# Patient Record
Sex: FEMALE | Race: WHITE
[De-identification: names, ages, dates, MRNs, and addresses within clinical notes are randomized per-mention and may not be internally consistent; named-entity substitution may affect disease eponyms.]

---

## 2018-03-14 ENCOUNTER — HOSPITAL ENCOUNTER (EMERGENCY)
Dept: HOSPITAL 44 - ED | Age: 23
Discharge: HOME | End: 2018-03-14
Payer: COMMERCIAL

## 2018-03-14 VITALS — SYSTOLIC BLOOD PRESSURE: 129 MMHG | DIASTOLIC BLOOD PRESSURE: 72 MMHG

## 2018-03-14 DIAGNOSIS — K59.00: ICD-10-CM

## 2018-03-14 DIAGNOSIS — E87.6: ICD-10-CM

## 2018-03-14 DIAGNOSIS — R10.9: Primary | ICD-10-CM

## 2018-03-14 LAB
BASOPHILS NFR BLD: 0.3 % (ref 0–1.5)
EGFR (NON-AFRICAN): > 60
EOSINOPHIL NFR BLD: 2.1 % (ref 0–6.8)
MCH RBC QN AUTO: 30.4 PG (ref 28–34)
MCV RBC AUTO: 88.3 FL (ref 80–100)
MONOCYTES %: 5.8 % (ref 0–11)
NEUTROPHILS #: 4.2 # K/UL (ref 1.4–7.7)

## 2018-03-14 PROCEDURE — 80053 COMPREHEN METABOLIC PANEL: CPT

## 2018-03-14 PROCEDURE — 99283 EMERGENCY DEPT VISIT LOW MDM: CPT

## 2018-03-14 PROCEDURE — 99282 EMERGENCY DEPT VISIT SF MDM: CPT

## 2018-03-14 PROCEDURE — 83690 ASSAY OF LIPASE: CPT

## 2018-03-14 PROCEDURE — 74022 RADEX COMPL AQT ABD SERIES: CPT

## 2018-03-14 PROCEDURE — 85025 COMPLETE CBC W/AUTO DIFF WBC: CPT

## 2018-03-14 NOTE — ED PHYSICIAN DOCUMENTATION
Abdominal Pain





- HISTORIAN


Historian: patient, spouse





- HPI


Stated Complaint: Right Side Abdominal Pain


Chief Complaint: Abdominal Pain


Additonal Information: 





genl abd pain yaima urq and suprapubic.  onset 3 d ago had good bm sunday sl 

better and lesseer bms since apin perhaps worse.  concerned re cancer or other 

serious condition-pt states sig anxiety


Onset: days ago (3)


Duration: constant, waxing, waning


Timing: still present


Context: denies: out of country travel, bad food, recent trauma


Severity: moderate


Quality: pain, cramping


Associated Symptoms: nausea.  denies: fever, chills, vomiting





- ROS


CONST: no problems


GI/: denies: constipation, black stools, bloody urine, bloody stools, dark 

urine, problems urinating


CVS/RESP: none


EYES/ENT: none


MS/SKIN/LYMPH: none, other (sl red rash "due to anxiety")


NEURO/PSYCH: anxiety





- SOCIAL HX


Smoking History: non-smoker


Alcohol Use: none


Drug Use: none





- FAMILY HX


Family History: no significant history





- PAST HX


Past History: none


Ischemic Bowel Risk Factors: none


Surgeries/Procedures: none


Immunizations: UTD.  denies: influenza, pneumovax


Home Medications: 


 Ambulatory Orders











 Medication  Instructions  Recorded


 


NK [NK]  03/14/18











Allergies/Adverse Reactions: 


 Allergies











Allergy/AdvReac Type Severity Reaction Status Date / Time


 


No Known Allergies Allergy   Unverified 03/14/18 20:39














- VITAL SIGNS


Vital Signs: 


 Vital Signs











Temp Pulse Resp BP Pulse Ox


 


 97.4 F L  99 H  18   134/68   99 


 


 03/14/18 20:30  03/14/18 20:30  03/14/18 20:30  03/14/18 20:30  03/14/18 20:30














- REVIEWED ASSESSMENTS


Nursing Assessment  Reviewed: Yes


Vitals Reviewed: Yes





ED Results Lab/Radiology





- Lab Results


Lab Results: 


 Lab Results











  03/14/18 03/14/18 03/14/18





  21:00 21:00 21:00


 


WBC      6.40 K/ul K/ul





     (4.00-12.00) 


 


RBC      4.45 M/ul M/ul





     (3.90-5.20) 


 


Hgb      13.5 g/dL g/dL





     (12.0-16.0) 


 


Hct      39.3 % %





     (34.5-46.5) 


 


MCV      88.3 fl fl





     (80.0-100.0) 


 


MCH      30.4 pg pg





     (28.0-34.0) 


 


MCHC      34.4 g/dL g/dL





     (30.0-36.0) 


 


RDW      13.0 % %





     (11.3-14.3) 


 


Plt Count      189 K/mm3 K/mm3





     (130-400) 


 


Neut % (Auto)      64.6 % %





     (39.0-79.0) 


 


Lymph % (Auto)      24.9 % %





     (16.0-50.0) 


 


Mono % (Auto)      5.8 % %





     (0.0-11.0) 


 


Eos % (Auto)      2.1 % %





     (0.0-6.8) 


 


Baso % (Auto)      0.3 





     (0.0-1.5) 


 


Neut # (Auto)      4.2 # k/uL # k/uL





     (1.4-7.7) 


 


Lymph # (Auto)      1.6 # k/uL # k/uL





     (0.6-4.0) 


 


Mono # (Auto)      0.4 # k/uL # k/uL





     (0.0-0.9) 


 


Eos # (Auto)      0.1 # k/uL # k/uL





     (0.0-0.6) 


 


Baso # (Auto)      0.0 # k/uL # k/uL





     (0.0-0.5) 


 


Reactive Lymphs %      2.3 % %





     (0.0-5.0) 


 


Reactive Lymphs #      0.2 # k/uL # k/uL





     (0.0-0.8) 


 


Sodium    143 mmol/L mmol/L  





    (136-145)  


 


Potassium    3.4 mmol/L L mmol/L  





    (3.5-5.1)  


 


Chloride    106 mmol/L mmol/L  





    ()  


 


Carbon Dioxide    23 mmol/L mmol/L  





    (22-30)  


 


BUN    11 mg/dL mg/dL  





    (7-17)  


 


Creatinine    0.80 mg/dL mg/dL  





    (0.52-1.04)  


 


Estimated Creat Clear    110   





    


 


Est GFR ( Amer)    > 60   





   (60 - ) 


 


Est GFR (Non-Af Amer)    > 60   





   (60 - ) 


 


Glucose    91 mg/dL mg/dL  





    ()  


 


Calcium    9.1 mg/dL mg/dL  





    (8.4-10.2)  


 


Total Bilirubin    0.7 mg/dL mg/dL  





    (0.2-1.3)  


 


AST    23 U/L U/L  





    (15-46)  


 


ALT    36 U/L U/L  





    (13-69)  


 


Alkaline Phosphatase    54 U/L U/L  





    ()  


 


Total Protein    7.1 g/dL g/dL  





    (6.3-8.2)  


 


Albumin    4.1 g/dL g/dL  





    (3.5-5.0)  


 


Lipase  83 U/L U/L    





   ()   














- Radiology


Radiology Impressions: 


xs feces area pain  rad read as excess gas





- Orders


Orders: 


 ED Orders











 Category Date Time Status


 


 ABD SERIES PA CHEST [RAD] Stat Exams  03/14/18 Taken


 


 CBC/PLATELET/DIFF Routine Lab  03/14/18 21:00 Completed


 


 CMP Routine Lab  03/14/18 21:00 Completed


 


 LIPASE Stat Lab  03/14/18 21:00 Completed


 


 URINALYSIS Routine Lab  03/14/18 Ordered














Abdominal Pain Physical Exam





- Physical Exam


General Appearance: mild distress


EENT: eye inspection normal


NECK: normal inspection.  No: lymphadenopathy, carotid bruit


RESPIRATORY: no resp distress, chest non-tender, breath sounds normal


CVS: reg rate & rhythm, heart sounds normal


ABDOMEN: soft, tenderness (throughout yaima urq and suprapubic)


BACK: normal inspection


SKIN: warm/dry, normal color, other (sl erythema).  No: cyanosis, diaphoresis, 

jaundice, mottled


NEURO: oriented X3


Vital Signs: 


 Vital Signs











Temp Pulse Resp BP Pulse Ox


 


 97.4 F L  99 H  18   134/68   99 


 


 03/14/18 20:30  03/14/18 20:30  03/14/18 20:30  03/14/18 20:30  03/14/18 20:30














Discharge


Clincal Impression: 


 un dx abd pain, hypokalemia, suspect constipation





Referrals: 


Primary Doctor,No [Primary Care Provider] - 2 Days


Comments: 





home laxative rted sy persist


Condition: Good


Disposition: 01 HOME, SELF-CARE


Decision to Admit: NO


Decision Time: 22:16

## 2018-03-15 NOTE — DIAGNOSTIC IMAGING REPORT
MONICA BECKHAM~

 [facility]

 [address] [address] [address]

~

Report Submission Date: Mar 14, 2018 9:50:59 PM CDT







Patient ~ Study  

 

Name: KATE RUIZ ~ Date: Mar 14, 2018 9:31:55 PM CDT

 

MRN: [id number] ~ Modality Type: DX

 

Gender: F ~ Description: ABDOMEN

 

: 89 ~ Institution: [facility]

 

Physician: MONICA BECKHAM

  ~ ~Accession: ~[id number]





~

Abdomen series with chest 



History: Abdominal pain 



Findings: A right pelvic phlebolith is observed. The bowel gas pattern is 
normal without obstruction, constipation, or free air. 



The lungs are hyperinflated without infiltrate or pleural effusion. Heart size 
is normal. 



Impression: Hyperinflation and normal bowel gas pattern.

~

Electronically signed on Mar 14, 2018 9:50:59 PM CDT by:

Saeed CHAHAL